# Patient Record
Sex: MALE | Race: BLACK OR AFRICAN AMERICAN | Employment: FULL TIME | URBAN - METROPOLITAN AREA
[De-identification: names, ages, dates, MRNs, and addresses within clinical notes are randomized per-mention and may not be internally consistent; named-entity substitution may affect disease eponyms.]

---

## 2017-01-16 DIAGNOSIS — R86.8 TERATOSPERMIA: ICD-10-CM

## 2017-01-16 DIAGNOSIS — N46.9 MALE INFERTILITY, UNSPECIFIED: ICD-10-CM

## 2017-01-16 LAB
ALBUMIN SERPL-MCNC: 3.7 G/DL (ref 3.4–5)
FERRITIN SERPL-MCNC: 55 NG/ML (ref 26–388)
FSH SERPL-ACNC: 7.9 IU/L (ref 0.7–10.8)
LH SERPL-ACNC: 7.4 IU/L (ref 1.5–9.3)
PROLACTIN SERPL-MCNC: 13 UG/L (ref 2–18)

## 2017-01-16 PROCEDURE — 84146 ASSAY OF PROLACTIN: CPT | Performed by: FAMILY MEDICINE

## 2017-01-16 PROCEDURE — 82670 ASSAY OF TOTAL ESTRADIOL: CPT | Performed by: FAMILY MEDICINE

## 2017-01-16 PROCEDURE — 36415 COLL VENOUS BLD VENIPUNCTURE: CPT | Performed by: FAMILY MEDICINE

## 2017-01-16 PROCEDURE — 83002 ASSAY OF GONADOTROPIN (LH): CPT | Performed by: FAMILY MEDICINE

## 2017-01-16 PROCEDURE — 84403 ASSAY OF TOTAL TESTOSTERONE: CPT | Performed by: FAMILY MEDICINE

## 2017-01-16 PROCEDURE — 83001 ASSAY OF GONADOTROPIN (FSH): CPT | Performed by: FAMILY MEDICINE

## 2017-01-16 PROCEDURE — 84270 ASSAY OF SEX HORMONE GLOBUL: CPT | Performed by: FAMILY MEDICINE

## 2017-01-16 PROCEDURE — 82728 ASSAY OF FERRITIN: CPT | Performed by: FAMILY MEDICINE

## 2017-01-16 PROCEDURE — 82040 ASSAY OF SERUM ALBUMIN: CPT | Performed by: FAMILY MEDICINE

## 2017-01-18 LAB
ESTRADIOL SERPL HS-MCNC: 18 PG/ML (ref 10–40)
SHBG SERPL-SCNC: 26 NMOL/L (ref 11–80)
TESTOST FREE SERPL-MCNC: 6.61 NG/DL (ref 4.7–24.4)
TESTOST SERPL-MCNC: 295 NG/DL (ref 240–950)

## 2017-02-13 ENCOUNTER — OFFICE VISIT (OUTPATIENT)
Dept: UROLOGY | Facility: CLINIC | Age: 52
End: 2017-02-13
Payer: COMMERCIAL

## 2017-02-13 VITALS — SYSTOLIC BLOOD PRESSURE: 146 MMHG | HEART RATE: 90 BPM | DIASTOLIC BLOOD PRESSURE: 95 MMHG

## 2017-02-13 DIAGNOSIS — R86.8 TERATOSPERMIA: Primary | ICD-10-CM

## 2017-02-13 DIAGNOSIS — R86.8 ASTHENOSPERMIA: ICD-10-CM

## 2017-02-13 PROCEDURE — 99214 OFFICE O/P EST MOD 30 MIN: CPT | Performed by: UROLOGY

## 2017-02-13 RX ORDER — CLOMIPHENE CITRATE 50 MG/1
25 TABLET ORAL
Qty: 20 TABLET | Refills: 3 | Status: SHIPPED | OUTPATIENT
Start: 2017-02-14 | End: 2017-05-18

## 2017-02-13 ASSESSMENT — PAIN SCALES - GENERAL: PAINLEVEL: NO PAIN (0)

## 2017-02-13 NOTE — MR AVS SNAPSHOT
Ulisses Adams     (MR # [id number])              After Visit Summary      Visit Information     Date & Time Provider Department Encounter #    2/13/2017  3:30 PM Leonides Andrews MD [facility] [id number]      Vitals  Most recent update: 2/13/2017  3:18 PM by Chey Rice MA    BP Pulse                (!) 146/95 (BP Location: Left arm, Patient Position: Chair, Cuff Size: Adult Regular) 90        Vitals History      Reason for visit     RECHECK teratospermia     Reason for Visit History      Diagnoses        Codes Comments    Teratospermia    -  Primary R86.8     Asthenospermia     R86.8       Orders     Future Labs/Procedures Expected by Expires    Follicle stimulating hormone [LAB86 Custom]  3/6/2017 (Approximate) 4/13/2017    Hemoglobin and hematocrit [C82507 Custom]  3/6/2017 (Approximate) 4/13/2017    PSA tumor marker [FQW0160 Custom]  3/6/2017 (Approximate) 4/13/2017    Testosterone total [AUD2793 Custom]  3/6/2017 (Approximate) 4/13/2017      Appointments for Next 1 Year     None      Follow-up and Disposition     Return if symptoms worsen or fail to improve.    Follow-up and Disposition History      Health Maintenance Due     Health Maintenance Due   Topic Date Due    TETANUS IMMUNIZATION (SYSTEM ASSIGNED)  06/03/1984    LIPID SCREEN Q5 YR MALE (SYSTEM ASSIGNED)  06/03/2001    COLON CANCER SCREEN (SYSTEM ASSIGNED)  06/03/2016    INFLUENZA VACCINE (SYSTEM ASSIGNED)  09/01/2016             Ongoing Health Issues     Problem Noted Resolved    Teratospermia[309164] 2/13/2017     Asthenospermia[847400] 2/13/2017        IMPORTANT INFORMATION  For all doctor appointments, please bring your medications in their original containers .   For all prescription refills please contact your pharmacy directly one week prior to your last dose and request a refill. The pharmacy will then contact us.  If you have any lab tests ordered please call [phone] to schedule a lab appointment.  To  contact [facility] [place] [facility] call [phone] / for [facility] [place] [facility] call [phone] / for [facility] [place] [facility] [place] [facility] call [phone].    Pharmacy: [id number] - [place], [place] - [address] [facility] AT [facility]    * * * * * * * PATIENT'S COPY* * * * * * * * * *              Start Taking        Disp Refills Start End    clomiPHENE (CLOMID) 50 MG tablet 20 tablet 3 2/14/2017     Sig - Route: Take 0.5 tablets (25 mg) by mouth three times a week Take Monday, Wednesday, Friday only. - Oral    Class: E-Prescribe    Pharmacy: [id number] - [facility] [place] - [address] [facility] [facility] AT [facility]

## 2017-02-13 NOTE — NURSING NOTE
Ulisses Adams's goals for this visit include:   Chief Complaint   Patient presents with     RECHECK     teratospermia        He requests these members of his care team be copied on today's visit information: yes    PCP: Zeesahn Vizcarra [facility]    Referring Provider:  ESTABLISHED PATIENT  No address on file    Chief Complaint   Patient presents with     RECHECK     teratospermia        Initial BP (!) 146/95 (BP Location: Left arm, Patient Position: Chair, Cuff Size: Adult Regular)  Pulse 90 There is no height or weight on file to calculate BMI.  Medication Reconciliation: complete    Do you need any medication refills at today's visit? KALPANA Rice, CMA

## 2017-02-13 NOTE — MR AVS SNAPSHOT
Patient's Medication List   [facility] in [place]       Patient:  HARI RIVERA   :  1965   Physician:  [facility]           This is your record.  Keep this with you and show to your community pharmacist(s) and physician(s) at each visit.     Allergies:  No Known Allergies          Medications  Valid as of: 2017 - 11:40 PM    Generic Name Brand Name Tablet Size Instructions for use    ClomiPHENE Citrate CLOMID 50 MG Take 0.5 tablets (25 mg) by mouth three times a week Take Monday, Wednesday, Friday only.        .           .           .           .

## 2017-02-15 NOTE — PROGRESS NOTES
Pt here for follow-up infertility.    He is a 51 year old AA male here in consultation today for fertility evaluation. His spouse is Conchita, age 35 this year (born 1982).    They have one previous pregnancy together, they have a 12 yo daughter.    Exam Nov 2016 showed bilateral grade II varicocele and bilaterally atrophic testes.    Component      Latest Ref Rng & Units 11/14/2016 12/2/2016 1/16/2017   Collection Method        Masturbation    Collection Site        HUSSEIN    Specimen Type        Semen    Lab Receipt Time        10:06 AM    Time of Analysis        10:15 AM    Analysis Temp - Centigrade      centigrade  23    Abstinence days      2 - 7 days  7    Liquefied      yes/no  Y    Viscous      yes/no  N    Agglutination      yes/no  N    pH      7.2 pH  8.0    Volume      1.5 ml  3.4    Concentration      15 million/ml  75.8    Total Number      39 million  258    Progressive motility      32 %  23 (A)    Non-progressive motility      %  5    Immotile      %  72    Total Progressive Motile      15.6 million  59.3    Vitality      58 %  60    Normal Sperm      4 % normal forms  1 (A)    Abnormal Sperm      morphology  99    Head Defect        99    Midpiece Defect        44    Tail Defect        24    Round Cells      2 million/ml  0.3    WBC      %  NA    Immature Sperm      %  NA    Cell Fragments      %  NA    Consent to Release to Partner        NO    Testosterone Total      240 - 950 ng/dL 195 (L)  295   Sex Hormone Binding Globulin      11 - 80 nmol/L 24  26   Free Testosterone Calculated      4.7 - 24.4 ng/dL 4.40 (L)  6.61   FSH      0.7 - 10.8 IU/L 6.8  7.9   Albumin      3.4 - 5.0 g/dL   3.7   Estradiol Ultrasensitive      10 - 40 pg/mL   18   Lutropin      1.5 - 9.3 IU/L   7.4   Prolactin      2 - 18 ug/L   13   Ferritin      26 - 388 ng/mL   55     Semen Analysis 8/14/16: 1.5ml, pH 7.5, 18.7M/cc, 40% motile, 10% morphology (>15%), Total Progressive Motile Count: 8.98 Million.      ASSESSMENT:    asthenospermia, teratospermia.    Secondary infertility    Bilateral varicoceles.     PLAN:    Discussed they may want to look at options with a female fertility specialist such as IUI (intrauterine inseminations) or IVF.    Discussed risks, benefits, and alternatives of varix repair, including various methods.    I counseled him extensively on the nature of varicoceles, and their possible effects on testosterone production and fertility. I described surgery and embolization approaches, and the detailed risks of surgical repair. These include damage to artery (ischemia), damage to lymphatics ( hydrocele), as well as risk of recurrence (</= 1%). Discussed that about 2/3rds of men see improved semen parameters and that improvement takes at least 3 months to see.     Discussed with it's hard to know the impact of varicocele repair on his sperm numbers.    Another option from the male side is to use a prescription medication like Clomid to fine tune the hormones some, and hopefully improve sperm production.  Discussed Clomid is marketed as a female fertility medication but is used commonly and safely for treating men as well.  Clomid helps stimulate the testicle to try to improve sperm production.  Results are variable and take at least 3-4 months on the medication to see possible improvement in semen analysis parameters, due to the slow rate of sperm production.  Side effects are uncommon but can include decreased libido, breast tenderness, or water weight gain.       Right now he wants to try Clomid and will consider varicocele repair in the future. Will get follow-up per Clomid protocol.    25min visit, over 50% face to face in counseling/discussion of above issues.

## 2017-05-08 DIAGNOSIS — R86.8 TERATOSPERMIA: ICD-10-CM

## 2017-05-08 DIAGNOSIS — R86.8 ASTHENOSPERMIA: ICD-10-CM

## 2017-05-08 LAB
FSH SERPL-ACNC: 8.4 IU/L (ref 0.7–10.8)
HCT VFR BLD AUTO: 47.6 % (ref 40–53)
HGB BLD-MCNC: 16.4 G/DL (ref 13.3–17.7)
PSA SERPL-MCNC: 1.16 UG/L (ref 0–4)

## 2017-05-08 PROCEDURE — 84153 ASSAY OF PSA TOTAL: CPT | Performed by: UROLOGY

## 2017-05-08 PROCEDURE — 85014 HEMATOCRIT: CPT | Performed by: UROLOGY

## 2017-05-08 PROCEDURE — 36415 COLL VENOUS BLD VENIPUNCTURE: CPT | Performed by: UROLOGY

## 2017-05-08 PROCEDURE — 84403 ASSAY OF TOTAL TESTOSTERONE: CPT | Performed by: UROLOGY

## 2017-05-08 PROCEDURE — 83001 ASSAY OF GONADOTROPIN (FSH): CPT | Performed by: UROLOGY

## 2017-05-08 PROCEDURE — 85018 HEMOGLOBIN: CPT | Performed by: UROLOGY

## 2017-05-11 LAB — TESTOST SERPL-MCNC: 414 NG/DL (ref 240–950)

## 2017-05-14 NOTE — PROGRESS NOTES
Please notify pt of these Clomid lab results:    Increase Clomid to 25mg daily please.  Semen analysis in 2-3 months and see me to discuss results in depth.    - thanks.  YULI

## 2017-05-18 ENCOUNTER — CARE COORDINATION (OUTPATIENT)
Dept: UROLOGY | Facility: CLINIC | Age: 52
End: 2017-05-18

## 2017-05-18 DIAGNOSIS — R86.8 TERATOSPERMIA: Primary | ICD-10-CM

## 2017-05-18 DIAGNOSIS — R86.8 TERATOSPERMIA: ICD-10-CM

## 2017-05-18 DIAGNOSIS — R86.8 ASTHENOSPERMIA: ICD-10-CM

## 2017-05-18 RX ORDER — CLOMIPHENE CITRATE 50 MG/1
25 TABLET ORAL DAILY
Qty: 30 TABLET | Refills: 3 | Status: SHIPPED | OUTPATIENT
Start: 2017-05-18 | End: 2019-04-17

## 2017-05-18 NOTE — PROGRESS NOTES
Left another message on home phone   Called cell the voicemail is full   Sent letter too    Sandra Azul RN   Care Coordinator Urology

## 2017-06-19 ENCOUNTER — OFFICE VISIT (OUTPATIENT)
Dept: UROLOGY | Facility: CLINIC | Age: 52
End: 2017-06-19
Payer: COMMERCIAL

## 2017-06-19 VITALS — DIASTOLIC BLOOD PRESSURE: 103 MMHG | HEART RATE: 80 BPM | SYSTOLIC BLOOD PRESSURE: 150 MMHG

## 2017-06-19 DIAGNOSIS — E29.1 HYPOGONADISM, MALE: ICD-10-CM

## 2017-06-19 DIAGNOSIS — I86.1 VARICOCELE: Primary | ICD-10-CM

## 2017-06-19 PROCEDURE — 99213 OFFICE O/P EST LOW 20 MIN: CPT | Performed by: UROLOGY

## 2017-06-19 ASSESSMENT — PAIN SCALES - GENERAL: PAINLEVEL: NO PAIN (0)

## 2017-06-19 NOTE — NURSING NOTE
Ulisses Adams's goals for this visit include:   Chief Complaint   Patient presents with     RECHECK     He requests these members of his care team be copied on today's visit information: NONE    Initial BP (!) 150/103 (BP Location: Left arm, Patient Position: Chair, Cuff Size: Adult Large)  Pulse 80 There is no height or weight on file to calculate BMI.  BP completed using cuff size: large

## 2017-06-19 NOTE — PATIENT INSTRUCTIONS
Semen Analysis (Strict Morph). For the sample the patient needs to abstain from ejaculation for 2-5 days prior, and the sample should be collected in clinic. Ejaculating too frequently can deplete the count whereas abstaining for long periods of time can decrease the number of live sperm. No lubrication, powders, saliva, or intercourse can be used. Partners can be in the room and help produce the sample.     [facility]  [facility]  [address]  ?[address] [place] [address]  [phone]

## 2017-06-19 NOTE — MR AVS SNAPSHOT
After Visit Summary   6/19/2017    Ulisses Adams    MRN: [id number]           Patient Information     Date Of Birth          1965        Visit Information        Provider Department      6/19/2017 4:00 PM Leonides Andrews MD [facility]        Today's Diagnoses     Varicocele    -  1    Hypogonadism, male          Care Instructions    Semen Analysis (Strict Morph). For the sample the patient needs to abstain from ejaculation for 2-5 days prior, and the sample should be collected in clinic. Ejaculating too frequently can deplete the count whereas abstaining for long periods of time can decrease the number of live sperm. No lubrication, powders, saliva, or intercourse can be used. Partners can be in the room and help produce the sample.     [facility]  [facility]  [address]  ?[address] [place] [address]. [place], [place] [address] [phone]            Follow-ups after your visit        Future tests that were ordered for you today     Open Future Orders        Priority Expected Expires Ordered    Follicle stimulating hormone Routine 9/19/2017 11/19/2017 6/19/2017    Testosterone total Routine 9/19/2017 11/19/2017 6/19/2017    Prostate spec antigen screen Routine 9/19/2017 11/19/2017 6/19/2017    Hemoglobin and hematocrit Routine 9/19/2017 11/19/2017 6/19/2017            Who to contact     If you have questions or need follow up information about today's clinic visit or your schedule please contact [facility] directly at [phone].  Normal or non-critical lab and imaging results will be communicated to you by MyChart, letter or phone within 4 business days after the clinic has received the results. If you do not hear from us within 7 days, please contact the clinic through Votizenhart or phone. If you have a critical or abnormal lab result, we will notify you by phone as soon as possible.  Submit refill requests through Spunkmobile  or call your pharmacy and they will forward the refill request to us. Please allow 3 business days for your refill to be completed.          Additional Information About Your Visit        [employer] Information     [email or web address] is an electronic gateway that provides easy, online access to your medical records. With [email or web address], you can request a clinic appointment, read your test results, renew a prescription or communicate with your care team.     To sign up for [email or web address] visit the website at [email or web address]   You will be asked to enter the access code listed below, as well as some personal information. Please follow the directions to create your username and password.     Your access code is: [email or web address]  Expires: 2017  4:30 PM     Your access code will  in 90 days. If you need help or a new code, please contact your [facility] or call [phone] for assistance.        Care EveryWhere ID     This is your Care EveryWhere ID. This could be used by other organizations to access your [place] medical records  [id number]        Your Vitals Were     Pulse                   80            Blood Pressure from Last 3 Encounters:   17 (!) 150/103   17 (!) 146/95   16 144/90    Weight from Last 3 Encounters:   16 74.8 kg (165 lb)               Primary Care Provider    [facility]       No address on file        Thank you!     Thank you for choosing [facility]  for your care. Our goal is always to provide you with excellent care. Hearing back from our patients is one way we can continue to improve our services. Please take a few minutes to complete the written survey that you may receive in the mail after your visit with us. Thank you!             Your Updated Medication List - Protect others around you: Learn how to safely use, store and throw away your medicines at www.disposemymeds.org.          This list is  accurate as of: 6/19/17  4:30 PM.  Always use your most recent med list.                   Brand Name Dispense Instructions for use    clomiPHENE 50 MG tablet    CLOMID    30 tablet    Take 0.5 tablets (25 mg) by mouth daily

## 2017-06-19 NOTE — PROGRESS NOTES
Pt returns to follow-up infertility.  Pt was taking Clomid.  Ran out 1-2 months ago.  Did not realize he got a prescription sent 1 month ago to [facility], 25mg QD.    lab  Component      Latest Ref Rng & Units 11/14/2016 1/16/2017 5/8/2017   Testosterone Total      240 - 950 ng/dL 195 (L) 295 414   Sex Hormone Binding Globulin      11 - 80 nmol/L 24 26    Free Testosterone Calculated      4.7 - 24.4 ng/dL 4.40 (L) 6.61    Hemoglobin      13.3 - 17.7 g/dL   16.4   Hematocrit      40.0 - 53.0 %   47.6   FSH      0.7 - 10.8 IU/L 6.8 7.9 8.4   Albumin      3.4 - 5.0 g/dL  3.7    Estradiol Ultrasensitive      10 - 40 pg/mL  18    Lutropin      1.5 - 9.3 IU/L  7.4    Prolactin      2 - 18 ug/L  13    Ferritin      26 - 388 ng/mL  55    PSA      0 - 4 ug/L   1.16       A-  Bilateral varicoceles - declines repair at this time.  Hypogonadism      Plan  - restart Clomid 25mg daily.  - recheck semen analysis and blood labs in 3 months.   - if no improvement, discussed varicocele repair may be an option.  -     15min visit, over 50% face to face in counseling/discussion of fertility management.

## 2019-03-18 ENCOUNTER — OFFICE VISIT (OUTPATIENT)
Dept: UROLOGY | Facility: CLINIC | Age: 54
End: 2019-03-18
Payer: COMMERCIAL

## 2019-03-18 VITALS
WEIGHT: 169.4 LBS | DIASTOLIC BLOOD PRESSURE: 102 MMHG | OXYGEN SATURATION: 98 % | HEART RATE: 73 BPM | SYSTOLIC BLOOD PRESSURE: 143 MMHG

## 2019-03-18 DIAGNOSIS — Z31.41 FERTILITY TESTING: Primary | ICD-10-CM

## 2019-03-18 DIAGNOSIS — I86.1 BILATERAL VARICOCELES: ICD-10-CM

## 2019-03-18 DIAGNOSIS — E29.1 TESTICULAR HYPOFUNCTION: ICD-10-CM

## 2019-03-18 PROCEDURE — 99213 OFFICE O/P EST LOW 20 MIN: CPT | Performed by: UROLOGY

## 2019-03-18 ASSESSMENT — PAIN SCALES - GENERAL: PAINLEVEL: NO PAIN (0)

## 2019-03-18 NOTE — NURSING NOTE
Ulisses Adams's goals for this visit include:   Chief Complaint   Patient presents with     RECHECK     Varicocele       He requests these members of his care team be copied on today's visit information: Yes    PCP: Zeeshan Lindsay [facility]    Referring Provider:  No referring provider defined for this encounter.    BP (!) 143/102 (BP Location: Left arm, Patient Position: Sitting, Cuff Size: Adult Regular)   Pulse 73   Wt 76.8 kg (169 lb 6.4 oz)   SpO2 98%     Do you need any medication refills at today's visit? No    Krysta Fields LPN

## 2019-03-18 NOTE — PATIENT INSTRUCTIONS
[facility]  [facility]  [address]  ?[address] [place] [address]  [phone]

## 2019-03-18 NOTE — PROGRESS NOTES
Ulisses Adams is a 53 year old male here for follow-up infertility.    History of bilateral varicoceles.  Not yet repaired.  I first saw him 2016.  1.5 years ago tried PO Clomid, no preg.  Spouse is Conchita, born 1982 so ~36-38yo.  They have a ~14yo daughter but no preg since then.  He is otherwise doing well without new health issues.    ROS  ROS: 10 point ROS neg other than the symptoms noted above in the HPI.   ROS:  No gross hematuria.  No testis pain.  No new pregnancies.  No ED.      PAST MEDICAL HISTORY:    No chronic medical problems   Puberty normal   No associated conditions such as ED or sexual dysfunction.  No  problems.      PAST SURG HISTORY  No history of surgery.       BP (!) 143/102 (BP Location: Left arm, Patient Position: Sitting, Cuff Size: Adult Regular)   Pulse 73   Wt 76.8 kg (169 lb 6.4 oz)   SpO2 98%     General: Alert, oriented, nad  Eyes: anicteric, EOMI.  Pulse: regular  Resps: normal, non-labored.  Abdomen:  nondistended.   EXAM:  Phallus circumcised, meatus adequate, no plaques palpated.   Left testis descended , size is 10-12 cc , consistency is soft . No intra-testicular masses.   Right testis descended , size is 10-12 cc , consistency is soft . No intra-testicular masses.   Epididymes present, non-tender, not enlarged.   Left cord: Vas present. Small grade II  varicocele noted.  Right cord: Vas present. Small grade II  varicocele noted.      SHANTEL deferred     Baseline labs and semen analysis.    Total testosterone did improve to 414 on Clomid which he's now off of.  Component      Latest Ref Rng & Units 11/14/2016 12/2/2016   Collection Method        Masturbation   Collection Site        HUSSEIN   Specimen Type        Semen   Lab Receipt Time        10:06 AM   Time of Analysis        10:15 AM   Analysis Temp - Centigrade      centigrade  23   Abstinence days      2 - 7 days  7   Liquefied      yes/no  Y   Viscous      yes/no  N   Agglutination      yes/no  N   pH      7.2 pH   8.0   Volume      1.5 ml  3.4   Concentration      15 million/ml  75.8   Total Number      39 million  258   Progressive motility      32 %  23 (A)   Non-progressive motility      %  5   Immotile      %  72   Total Progressive Motile      15.6 million  59.3   Vitality      58 %  60   Normal Sperm      4 % normal forms  1 (A)   Abnormal Sperm      morphology  99   Head Defect        99   Midpiece Defect        44   Tail Defect        24   Round Cells      2 million/ml  0.3   WBC      %  NA   Immature Sperm      %  NA   Cell Fragments      %  NA   Consent to Release to Partner        NO   Testosterone Total      240 - 950 ng/dL 195 (L)    Sex Hormone Binding Globulin      11 - 80 nmol/L 24    Free Testosterone Calculated      4.7 - 24.4 ng/dL 4.40 (L)    FSH      0.7 - 10.8 IU/L 6.8        A-  Teratospermia, oligospermia.    Plan  -T, FSH, SA and I'll contact him with results.  - again discussed varicocele repair as the main fixable option from male side.  - discussed IUI or IVF with a female fertility specialist as other options.    - Discussed risks, benefits, and alternatives of varix repair, including various methods.  I counseled him extensively on the nature of varicoceles, and their possible effects on testosterone production and fertility.  I described surgery and embolization approaches, and the detailed risks of surgical repair.  These include damage to artery (ischemia), damage to lymphatics ( hydrocele), as well as risk of recurrence (</= 1%). Discussed that about 2/3rds of men see improved semen parameters and that improvement takes at least 3 months to see.  Discussed that in rare cases testis pain can occur after varicocele repair.    We'll get labs and go from there.    Elaine NAVARRO

## 2019-04-06 DIAGNOSIS — Z31.41 FERTILITY TESTING: ICD-10-CM

## 2019-04-06 LAB — FSH SERPL-ACNC: 9.1 IU/L (ref 0.7–10.8)

## 2019-04-06 PROCEDURE — 84403 ASSAY OF TOTAL TESTOSTERONE: CPT | Performed by: UROLOGY

## 2019-04-06 PROCEDURE — 36415 COLL VENOUS BLD VENIPUNCTURE: CPT | Performed by: UROLOGY

## 2019-04-06 PROCEDURE — 84270 ASSAY OF SEX HORMONE GLOBUL: CPT | Performed by: UROLOGY

## 2019-04-06 PROCEDURE — 83001 ASSAY OF GONADOTROPIN (FSH): CPT | Performed by: UROLOGY

## 2019-04-09 LAB
SHBG SERPL-SCNC: 26 NMOL/L (ref 11–80)
TESTOST FREE SERPL-MCNC: 11.16 NG/DL (ref 4.7–24.4)
TESTOST SERPL-MCNC: 471 NG/DL (ref 240–950)

## 2019-04-17 ENCOUNTER — PATIENT OUTREACH (OUTPATIENT)
Dept: UROLOGY | Facility: CLINIC | Age: 54
End: 2019-04-17

## 2019-04-17 DIAGNOSIS — R86.8 TERATOSPERMIA: ICD-10-CM

## 2019-04-17 DIAGNOSIS — R86.8 ASTHENOSPERMIA: Primary | ICD-10-CM

## 2019-04-17 RX ORDER — CLOMIPHENE CITRATE 50 MG/1
25 TABLET ORAL DAILY
Qty: 30 TABLET | Refills: 4 | Status: SHIPPED | OUTPATIENT
Start: 2019-04-17

## 2019-04-17 NOTE — PROGRESS NOTES
Discussed continuing same dose of clomid. He needs to get labs and semen analysis done in 3 months   Refill 3 months to Conchis Azul, RN   Care Coordinator Urology

## 2019-05-09 DIAGNOSIS — R86.8 ASTHENOSPERMIA: ICD-10-CM

## 2019-05-09 PROCEDURE — 89322 SEMEN ANAL STRICT CRITERIA: CPT

## 2019-05-10 LAB
ABNORMAL SPERM: 98 MORPHOLOGY
ABSTINENCE DAYS: 2 DAYS (ref 2–7)
AGGLUTINATION: NO YES/NO
ANALYSIS TEMP - CENTIGRADE: 23 CENTIGRADE
CELL FRAGMENTS: ABNORMAL %
COLLECTION METHOD: ABNORMAL
COLLECTION SITE: ABNORMAL
CONSENT TO RELEASE TO PARTNER: YES
HEAD DEFECT: 98
IMMATURE SPERM: ABNORMAL %
IMMOTILE: 32 %
LAB RECEIPT TIME: ABNORMAL
LIQUEFIED: YES YES/NO
MIDPIECE DEFECT: 44
NON-PROGRESSIVE MOTILITY: 4 %
NORMAL SPERM: 2 % NORMAL FORMS (ref 4–?)
PROGRESSIVE MOTILITY: 64 % (ref 32–?)
ROUND CELLS: <0.1 MILLION/ML (ref ?–2)
SPECIMEN CONCENTRATION: 21 MILLION/ML (ref 15–?)
SPECIMEN PH: 7.6 PH (ref 7.2–?)
SPECIMEN TYPE: ABNORMAL
SPECIMEN VOL UR: 2.8 ML (ref 1.5–?)
TAIL DEFECT: 10
TIME OF ANALYSIS: ABNORMAL
TOTAL NUMBER: 59 MILLION (ref 39–?)
TOTAL PROGRESSIVE MOTILE: 38 MILLION (ref 15.6–?)
VISCOUS: NO YES/NO
VITALITY: ABNORMAL % (ref 58–?)
WBC SPECIMEN: ABNORMAL %

## 2024-03-07 ENCOUNTER — OFFICE VISIT (OUTPATIENT)
Dept: FAMILY MEDICINE | Facility: CLINIC | Age: 59
End: 2024-03-07
Payer: COMMERCIAL

## 2024-03-07 VITALS
TEMPERATURE: 97.9 F | HEART RATE: 79 BPM | OXYGEN SATURATION: 98 % | SYSTOLIC BLOOD PRESSURE: 142 MMHG | DIASTOLIC BLOOD PRESSURE: 96 MMHG | WEIGHT: 179.2 LBS | RESPIRATION RATE: 20 BRPM

## 2024-03-07 DIAGNOSIS — R03.0 ELEVATED BLOOD PRESSURE READING WITHOUT DIAGNOSIS OF HYPERTENSION: ICD-10-CM

## 2024-03-07 DIAGNOSIS — L73.9 FOLLICULITIS: Primary | ICD-10-CM

## 2024-03-07 PROCEDURE — 99213 OFFICE O/P EST LOW 20 MIN: CPT | Performed by: PHYSICIAN ASSISTANT

## 2024-03-07 RX ORDER — MUPIROCIN 20 MG/G
OINTMENT TOPICAL 3 TIMES DAILY
Qty: 30 G | Refills: 2 | Status: SHIPPED | OUTPATIENT
Start: 2024-03-07 | End: 2024-03-07

## 2024-03-07 RX ORDER — MUPIROCIN 20 MG/G
OINTMENT TOPICAL 3 TIMES DAILY
Qty: 30 G | Refills: 2 | Status: SHIPPED | OUTPATIENT
Start: 2024-03-07

## 2024-03-07 ASSESSMENT — ENCOUNTER SYMPTOMS
SHORTNESS OF BREATH: 0
FEVER: 0
COUGH: 0

## 2024-03-07 NOTE — PATIENT INSTRUCTIONS
The rash on your scalp is likely due to inflammation/infection of the hair follicle.  This could be from your razor or possibly from ingrown hairs.  I would recommend cleaning your razor and any attachments used after each use with rubbing alcohol.  Additionally you have been prescribed a topical antibiotic, mupirocin.  If you continue to develop the rash even with treatment, you could try cutting her hair slightly longer as this could help prevent ingrown hairs.    Your blood pressure reading was elevated today in clinic and has been elevated in the past.  I would like you to check your blood pressure 3 times per week and keep a log.  You have been prescribed a blood pressure cuff and can pick this up at our pharmacy or any of the listed locations on the paperwork.  Please bring your log to your primary care visit/physical for review.

## 2024-03-07 NOTE — PROGRESS NOTES
Assessment & Plan     Folliculitis  Patient is a 58-year-old male who presents to clinic with wife due to pruritic rash of scalp which started within the last week.  Patient typically shaves head with electric razor.  Vital signs significant for elevated blood pressure.  No fever or tachycardia to suggest systemic infection.  Exam findings are consistent with folliculitis.  Discussed the importance of cleaning razor and attachments with rubbing alcohol.  Mupirocin prescribed to apply to rash.  Also discussed possibility of ingrown hairs as cause of symptoms.  If rash does not improve with current treatment plan, recommended cutting hair slightly longer to prevent ingrowing.  Return precautions provided.  - mupirocin (BACTROBAN) 2 % external ointment; Apply topically 3 times daily    Elevated blood pressure reading without diagnosis of hypertension  Patient was found to have elevated blood pressure today.  Upon further chart review blood pressure has been elevated in the past.  No known hypertension.  Discussed this with patient and wife and recommended keeping a blood pressure log, checking blood pressure 3 times per week while sitting/resting for at least 5 minutes prior to checking.  Blood pressure cuff prescribed.  Patient to bring log with him to appointment for establishing care with PCP which was scheduled today.  Return precautions provided.  - Home Blood Pressure Monitor Order for DME - ONLY FOR DME      See Patient Instructions    Jina Gtz is a 58 year old, presenting for the following health issues:  Derm Problem and Rash      3/7/2024     7:43 AM   Additional Questions   Roomed by Cris   Accompanied by wife         3/7/2024     7:43 AM   Patient Reported Additional Medications   Patient reports taking the following new medications none     History of Present Illness       Reason for visit:  Experiencing some rashes on my head  Symptom onset:  1-2 weeks ago  Symptoms include:   Itching  Symptom intensity:  Moderate  Symptom progression:  Staying the same  Had these symptoms before:  No  What makes it worse:  Unknown  What makes it better:  None    He eats 0-1 servings of fruits and vegetables daily.He consumes 2 sweetened beverage(s) daily.He exercises with enough effort to increase his heart rate 30 to 60 minutes per day.  He exercises with enough effort to increase his heart rate 3 or less days per week.   He is taking medications regularly.       Patient notes rash on scalp which he discovered in the last week.  He typically shaves his scalp with an electric razor.  He has been cleaning the razor with an all-purpose .  Rash is itchy.    Patient denies any history of high blood pressure in the past.  He used to have a blood pressure cuff at home, but notes it was very old.    Review of Systems   Constitutional:  Negative for fever.   Respiratory:  Negative for cough and shortness of breath.    Cardiovascular:  Negative for chest pain.   Skin:  Positive for rash.   All other systems reviewed and are negative.            Objective    BP (!) 142/96   Pulse 79   Temp 97.9  F (36.6  C) (Temporal)   Resp 20   Wt 81.3 kg (179 lb 3.2 oz)   SpO2 98%   There is no height or weight on file to calculate BMI.  Physical Exam  Vitals and nursing note reviewed.   Constitutional:       General: He is not in acute distress.     Appearance: Normal appearance.   HENT:      Head: Normocephalic and atraumatic.   Eyes:      Extraocular Movements: Extraocular movements intact.      Pupils: Pupils are equal, round, and reactive to light.   Cardiovascular:      Rate and Rhythm: Normal rate and regular rhythm.      Heart sounds: Normal heart sounds. No murmur heard.  Pulmonary:      Effort: Pulmonary effort is normal.      Breath sounds: Normal breath sounds. No wheezing, rhonchi or rales.   Musculoskeletal:         General: Normal range of motion.      Cervical back: Normal range of motion.   Skin:      General: Skin is warm and dry.      Findings: Rash (Maculopapular, mildly erythematous, rash with approximately 20 lesions less than 0.25 cm on scalp.  No ulceration, induration, tenderness palpation, bulla.  few tiny pustules present) present.   Neurological:      General: No focal deficit present.      Mental Status: He is alert.   Psychiatric:         Mood and Affect: Mood normal.         Behavior: Behavior normal.                    Signed Electronically by: Jamila Carter PA-C